# Patient Record
Sex: MALE | Race: WHITE | Employment: UNEMPLOYED | URBAN - METROPOLITAN AREA
[De-identification: names, ages, dates, MRNs, and addresses within clinical notes are randomized per-mention and may not be internally consistent; named-entity substitution may affect disease eponyms.]

---

## 2022-05-13 ENCOUNTER — HOSPITAL ENCOUNTER (EMERGENCY)
Age: 42
Discharge: ANOTHER HOSPITAL | End: 2022-05-13
Attending: EMERGENCY MEDICINE
Payer: MEDICAID

## 2022-05-13 ENCOUNTER — HOSPITAL ENCOUNTER (INPATIENT)
Age: 42
LOS: 6 days | Discharge: HOME | DRG: 751 | End: 2022-05-19
Attending: PSYCHIATRY & NEUROLOGY | Admitting: PSYCHIATRY & NEUROLOGY
Payer: MEDICAID

## 2022-05-13 VITALS
BODY MASS INDEX: 32 KG/M2 | OXYGEN SATURATION: 97 % | WEIGHT: 216.05 LBS | DIASTOLIC BLOOD PRESSURE: 74 MMHG | TEMPERATURE: 97.4 F | HEIGHT: 69 IN | HEART RATE: 80 BPM | RESPIRATION RATE: 18 BRPM | SYSTOLIC BLOOD PRESSURE: 136 MMHG

## 2022-05-13 DIAGNOSIS — R45.851 SUICIDAL IDEATION: Primary | ICD-10-CM

## 2022-05-13 DIAGNOSIS — S30.861A TICK BITE OF ABDOMEN, INITIAL ENCOUNTER: ICD-10-CM
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
## 2022-05-13 LAB
ALBUMIN SERPL-MCNC: 3.4 G/DL (ref 3.5–5)
ALBUMIN/GLOB SERPL: 0.9 {RATIO} (ref 1.1–2.2)
ALP SERPL-CCNC: 109 U/L (ref 45–117)
ALT SERPL-CCNC: 22 U/L (ref 12–78)
AMPHET UR QL SCN: NEGATIVE
ANION GAP SERPL CALC-SCNC: 6 MMOL/L (ref 5–15)
APAP SERPL-MCNC: <2 UG/ML (ref 10–30)
APPEARANCE UR: CLEAR
AST SERPL-CCNC: 9 U/L (ref 15–37)
ATRIAL RATE: 90 BPM
BACTERIA URNS QL MICRO: NEGATIVE /HPF
BARBITURATES UR QL SCN: NEGATIVE
BASOPHILS # BLD: 0.1 K/UL (ref 0–0.1)
BASOPHILS NFR BLD: 1 % (ref 0–1)
BENZODIAZ UR QL: NEGATIVE
BILIRUB SERPL-MCNC: 0.4 MG/DL (ref 0.2–1)
BILIRUB UR QL: NEGATIVE
BUN SERPL-MCNC: 7 MG/DL (ref 6–20)
BUN/CREAT SERPL: 7 (ref 12–20)
CALCIUM SERPL-MCNC: 8.2 MG/DL (ref 8.5–10.1)
CALCULATED P AXIS, ECG09: 61 DEGREES
CALCULATED R AXIS, ECG10: 32 DEGREES
CALCULATED T AXIS, ECG11: 28 DEGREES
CANNABINOIDS UR QL SCN: NEGATIVE
CHLORIDE SERPL-SCNC: 105 MMOL/L (ref 97–108)
CO2 SERPL-SCNC: 26 MMOL/L (ref 21–32)
COCAINE UR QL SCN: NEGATIVE
COLOR UR: NORMAL
COVID-19 RAPID TEST, COVR: NOT DETECTED
CREAT SERPL-MCNC: 0.98 MG/DL (ref 0.7–1.3)
DIAGNOSIS, 93000: NORMAL
DIFFERENTIAL METHOD BLD: ABNORMAL
DRUG SCRN COMMENT,DRGCM: NORMAL
EOSINOPHIL # BLD: 0.1 K/UL (ref 0–0.4)
EOSINOPHIL NFR BLD: 1 % (ref 0–7)
EPITH CASTS URNS QL MICRO: NORMAL /LPF
ERYTHROCYTE [DISTWIDTH] IN BLOOD BY AUTOMATED COUNT: 13.4 % (ref 11.5–14.5)
ETHANOL SERPL-MCNC: <10 MG/DL
FLUAV RNA SPEC QL NAA+PROBE: NOT DETECTED
FLUBV RNA SPEC QL NAA+PROBE: NOT DETECTED
GLOBULIN SER CALC-MCNC: 3.8 G/DL (ref 2–4)
GLUCOSE SERPL-MCNC: 111 MG/DL (ref 65–100)
GLUCOSE UR STRIP.AUTO-MCNC: NEGATIVE MG/DL
HCT VFR BLD AUTO: 43.1 % (ref 36.6–50.3)
HGB BLD-MCNC: 14.2 G/DL (ref 12.1–17)
HGB UR QL STRIP: NEGATIVE
HYALINE CASTS URNS QL MICRO: NORMAL /LPF (ref 0–2)
IMM GRANULOCYTES # BLD AUTO: 0.1 K/UL (ref 0–0.04)
IMM GRANULOCYTES NFR BLD AUTO: 0 % (ref 0–0.5)
KETONES UR QL STRIP.AUTO: NEGATIVE MG/DL
LEUKOCYTE ESTERASE UR QL STRIP.AUTO: NEGATIVE
LYMPHOCYTES # BLD: 2.6 K/UL (ref 0.8–3.5)
LYMPHOCYTES NFR BLD: 23 % (ref 12–49)
MCH RBC QN AUTO: 28.5 PG (ref 26–34)
MCHC RBC AUTO-ENTMCNC: 32.9 G/DL (ref 30–36.5)
MCV RBC AUTO: 86.5 FL (ref 80–99)
METHADONE UR QL: NEGATIVE
MONOCYTES # BLD: 0.9 K/UL (ref 0–1)
MONOCYTES NFR BLD: 8 % (ref 5–13)
NEUTS SEG # BLD: 7.4 K/UL (ref 1.8–8)
NEUTS SEG NFR BLD: 67 % (ref 32–75)
NITRITE UR QL STRIP.AUTO: NEGATIVE
NRBC # BLD: 0 K/UL (ref 0–0.01)
NRBC BLD-RTO: 0 PER 100 WBC
OPIATES UR QL: NEGATIVE
P-R INTERVAL, ECG05: 152 MS
PCP UR QL: NEGATIVE
PH UR STRIP: 5 [PH] (ref 5–8)
PLATELET # BLD AUTO: 380 K/UL (ref 150–400)
PMV BLD AUTO: 8.8 FL (ref 8.9–12.9)
POTASSIUM SERPL-SCNC: 3.9 MMOL/L (ref 3.5–5.1)
PROT SERPL-MCNC: 7.2 G/DL (ref 6.4–8.2)
PROT UR STRIP-MCNC: NEGATIVE MG/DL
Q-T INTERVAL, ECG07: 340 MS
QRS DURATION, ECG06: 78 MS
QTC CALCULATION (BEZET), ECG08: 415 MS
RBC # BLD AUTO: 4.98 M/UL (ref 4.1–5.7)
RBC #/AREA URNS HPF: NORMAL /HPF (ref 0–5)
SALICYLATES SERPL-MCNC: <1.7 MG/DL (ref 2.8–20)
SARS-COV-2, COV2: NORMAL
SARS-COV-2, COV2: NOT DETECTED
SODIUM SERPL-SCNC: 137 MMOL/L (ref 136–145)
SOURCE, COVRS: NORMAL
SP GR UR REFRACTOMETRY: 1.01
UA: UC IF INDICATED,UAUC: NORMAL
UROBILINOGEN UR QL STRIP.AUTO: 0.2 EU/DL (ref 0.2–1)
VENTRICULAR RATE, ECG03: 90 BPM
WBC # BLD AUTO: 11.2 K/UL (ref 4.1–11.1)
WBC URNS QL MICRO: NORMAL /HPF (ref 0–4)

## 2022-05-13 PROCEDURE — 87635 SARS-COV-2 COVID-19 AMP PRB: CPT

## 2022-05-13 PROCEDURE — 90791 PSYCH DIAGNOSTIC EVALUATION: CPT

## 2022-05-13 PROCEDURE — 74011250637 HC RX REV CODE- 250/637: Performed by: EMERGENCY MEDICINE

## 2022-05-13 PROCEDURE — 81001 URINALYSIS AUTO W/SCOPE: CPT

## 2022-05-13 PROCEDURE — 99285 EMERGENCY DEPT VISIT HI MDM: CPT

## 2022-05-13 PROCEDURE — 74011000250 HC RX REV CODE- 250: Performed by: PSYCHIATRY & NEUROLOGY

## 2022-05-13 PROCEDURE — 80307 DRUG TEST PRSMV CHEM ANLYZR: CPT

## 2022-05-13 PROCEDURE — 65220000003 HC RM SEMIPRIVATE PSYCH

## 2022-05-13 PROCEDURE — 93005 ELECTROCARDIOGRAM TRACING: CPT

## 2022-05-13 PROCEDURE — 74011250637 HC RX REV CODE- 250/637: Performed by: PSYCHIATRY & NEUROLOGY

## 2022-05-13 PROCEDURE — 85025 COMPLETE CBC W/AUTO DIFF WBC: CPT

## 2022-05-13 PROCEDURE — 36415 COLL VENOUS BLD VENIPUNCTURE: CPT

## 2022-05-13 PROCEDURE — 80053 COMPREHEN METABOLIC PANEL: CPT

## 2022-05-13 PROCEDURE — 80179 DRUG ASSAY SALICYLATE: CPT

## 2022-05-13 PROCEDURE — 87636 SARSCOV2 & INF A&B AMP PRB: CPT

## 2022-05-13 PROCEDURE — 80143 DRUG ASSAY ACETAMINOPHEN: CPT

## 2022-05-13 PROCEDURE — 82077 ASSAY SPEC XCP UR&BREATH IA: CPT

## 2022-05-13 RX ORDER — DOXYCYCLINE HYCLATE 100 MG
100 TABLET ORAL 2 TIMES DAILY
Qty: 14 TABLET | Refills: 0 | Status: SHIPPED | OUTPATIENT
Start: 2022-05-13 | End: 2022-05-19

## 2022-05-13 RX ORDER — LORAZEPAM 2 MG/ML
1 INJECTION INTRAMUSCULAR
Status: DISCONTINUED | OUTPATIENT
Start: 2022-05-13 | End: 2022-05-19 | Stop reason: HOSPADM

## 2022-05-13 RX ORDER — TRAZODONE HYDROCHLORIDE 150 MG/1
150 TABLET ORAL
COMMUNITY
End: 2022-05-19

## 2022-05-13 RX ORDER — BENZTROPINE MESYLATE 1 MG/1
1 TABLET ORAL
Status: DISCONTINUED | OUTPATIENT
Start: 2022-05-13 | End: 2022-05-19 | Stop reason: HOSPADM

## 2022-05-13 RX ORDER — ADHESIVE BANDAGE
30 BANDAGE TOPICAL DAILY PRN
Status: DISCONTINUED | OUTPATIENT
Start: 2022-05-13 | End: 2022-05-19 | Stop reason: HOSPADM

## 2022-05-13 RX ORDER — OLANZAPINE 5 MG/1
5 TABLET ORAL
Status: DISCONTINUED | OUTPATIENT
Start: 2022-05-13 | End: 2022-05-19 | Stop reason: HOSPADM

## 2022-05-13 RX ORDER — DOXYCYCLINE HYCLATE 100 MG
100 TABLET ORAL EVERY 12 HOURS
Status: DISCONTINUED | OUTPATIENT
Start: 2022-05-13 | End: 2022-05-19 | Stop reason: HOSPADM

## 2022-05-13 RX ORDER — HYDROXYZINE 25 MG/1
50 TABLET, FILM COATED ORAL
Status: DISCONTINUED | OUTPATIENT
Start: 2022-05-13 | End: 2022-05-19 | Stop reason: HOSPADM

## 2022-05-13 RX ORDER — DIPHENHYDRAMINE HYDROCHLORIDE 50 MG/ML
50 INJECTION, SOLUTION INTRAMUSCULAR; INTRAVENOUS
Status: DISCONTINUED | OUTPATIENT
Start: 2022-05-13 | End: 2022-05-19 | Stop reason: HOSPADM

## 2022-05-13 RX ORDER — DOXYCYCLINE HYCLATE 100 MG
100 TABLET ORAL
Status: COMPLETED | OUTPATIENT
Start: 2022-05-13 | End: 2022-05-13

## 2022-05-13 RX ORDER — TRAZODONE HYDROCHLORIDE 50 MG/1
50 TABLET ORAL
Status: DISCONTINUED | OUTPATIENT
Start: 2022-05-13 | End: 2022-05-13

## 2022-05-13 RX ORDER — ACETAMINOPHEN 325 MG/1
650 TABLET ORAL
Status: DISCONTINUED | OUTPATIENT
Start: 2022-05-13 | End: 2022-05-19 | Stop reason: HOSPADM

## 2022-05-13 RX ORDER — DULOXETIN HYDROCHLORIDE 60 MG/1
60 CAPSULE, DELAYED RELEASE ORAL 2 TIMES DAILY
Status: DISCONTINUED | OUTPATIENT
Start: 2022-05-13 | End: 2022-05-19 | Stop reason: HOSPADM

## 2022-05-13 RX ORDER — DULOXETIN HYDROCHLORIDE 60 MG/1
60 CAPSULE, DELAYED RELEASE ORAL 2 TIMES DAILY
COMMUNITY
End: 2022-05-19

## 2022-05-13 RX ORDER — BUPROPION HYDROCHLORIDE 100 MG/1
100 TABLET ORAL 2 TIMES DAILY
COMMUNITY
End: 2022-05-19

## 2022-05-13 RX ORDER — HALOPERIDOL 5 MG/ML
5 INJECTION INTRAMUSCULAR
Status: DISCONTINUED | OUTPATIENT
Start: 2022-05-13 | End: 2022-05-19 | Stop reason: HOSPADM

## 2022-05-13 RX ADMIN — DOXYCYCLINE HYCLATE 100 MG: 100 TABLET, COATED ORAL at 21:23

## 2022-05-13 RX ADMIN — DOXYCYCLINE HYCLATE 100 MG: 100 TABLET, COATED ORAL at 06:21

## 2022-05-13 RX ADMIN — HYDROXYZINE HYDROCHLORIDE 50 MG: 25 TABLET, FILM COATED ORAL at 21:23

## 2022-05-13 RX ADMIN — DOXYCYCLINE HYCLATE 100 MG: 100 TABLET, COATED ORAL at 12:07

## 2022-05-13 RX ADMIN — DULOXETINE HYDROCHLORIDE 60 MG: 60 CAPSULE, DELAYED RELEASE ORAL at 17:33

## 2022-05-13 RX ADMIN — TRAZODONE HYDROCHLORIDE 150 MG: 100 TABLET ORAL at 21:23

## 2022-05-13 RX ADMIN — BACITRACIN ZINC NEOMYCIN SULFATE POLYMYXIN B SULFATE: 400; 3.5; 5 OINTMENT TOPICAL at 18:51
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
## 2022-05-13 NOTE — ED NOTES
Bedside shift change report given to Zo (oncoming nurse) by Carola/Cassy (offgoing nurse). Report included the following information SBAR, Kardex and ED Summary.
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
## 2022-05-13 NOTE — GROUP NOTE
EVONNE  GROUP DOCUMENTATION INDIVIDUAL                                                                          Group Therapy Note    Date: 5/13/2022    Group Start Time: 1400  Group End Time: 1500  Group Topic: Recreational/Music Therapy    [facility] 3 ACUTE BEHAV [facility]    [place], [address] [facility] GROUP DOCUMENTATION GROUP    Group Therapy Note    Attendees: 4         Attendance: Attended    Patient's Goal:  To concentrate on selected task    Interventions/techniques: Supported-crafts,games,music    Follows Directions:  Followed directions    Interactions: Interacted appropriately    Mental Status: Calm    Behavior/appearance: Attentive, Cooperative and Needed prompting    Goals Achieved: Able to engage in interactions and Able to listen to others-active participant in game    Anson Backbone

## 2022-05-13 NOTE — BH NOTES
Precious Álvarez RN (wound care) to asses patient. Recommended neosporin BID for sores on legs and abdomen and po benadryl for itching. RN updated MD.     Uneventful shift. Hourly rounding completed by RN. NAD. Bedside shift change report given to Kelly Solano RN (oncoming nurse) by Sinan Foreman RN (offgoing nurse). Report included the following information SBAR, Kardex, MAR, Accordion and Recent Results.

## 2022-05-13 NOTE — ED NOTES
TRANSFER - OUT REPORT:    Verbal report given to Angel Sousa RN on Yousuf Calderón  being transferred to [place] [address] Dept for routine progression of care       Report consisted of patients Situation, Background, Assessment and   Recommendations(SBAR). Information from the following report(s) SBAR, Kardex, ED Summary, Intake/Output, MAR, Recent Results, Med Rec Status and Cardiac Rhythm and vital signs was reviewed with the receiving nurse. Lines:       Opportunity for questions and clarification was provided.       Patient transported with:   [facility]

## 2022-05-13 NOTE — ED NOTES
[facility] leaving ED [facility] to  transport patient to [facility].

## 2022-05-13 NOTE — ED NOTES
Attempted to call Julia at [facility] to advise that patient was given a prescription for an antibiotic. Julia wasn't available, left message with nurse. Nurse read back and confirmed.

## 2022-05-13 NOTE — BSMART NOTE
BSMART Note    Patient has been accepted to [facility] room 327 by Dr. Dorothea Shone. The number for nursing report is [phone]. He cannot be transferred until his PCR COVID test results.     Luly Sarabia MA

## 2022-05-13 NOTE — ED TRIAGE NOTES
Patient brought in via police custody with thoughts of SI. Stated that he has a plan to \"jump in front of a train\". Past history SI in 2017 where he had \"plans to shoot himself in the head but then stopped before actually doing it\". Patient is cooperative at this time.

## 2022-05-13 NOTE — GROUP NOTE
EVONNE  GROUP DOCUMENTATION INDIVIDUAL                                                                          Group Therapy Note    Date: 5/13/2022    Group Start Time: 1000  Group End Time: 1100  Group Topic: Topic Group    [facility] 3 ACUTE BEHAV TH    [place] [facility] GROUP    Group Therapy Note    Attendees: 7         Attendance: Did not attend    Patient's Goal:      Interventions/techniques  Valeria Chester

## 2022-05-13 NOTE — PROGRESS NOTES
Wound Care Consult 5/13/22    Patient has multiple bug bites to BLE and abdomen. Patient has a reddened area on LLQ from reported tick bite. No areas with exudate. Some areas are crusting, other areas are open due to scratching. Spoke with Ernestine Davila RN    Keep skin clean, dry and apply neosporin to open areas twice daily. Apply moisturizing cream to dry feet and heels. Patient has thick, brittle nails and calloused heels and some calluses on base of toes. No other skin issues. No follow up needed.      Maureen Chacon RN   Wound Care

## 2022-05-13 NOTE — PROGRESS NOTES
Behavioral Services  Medicare Certification Upon Admission    I certify that this patient's inpatient psychiatric hospital admission is medically necessary for:    [x] (1) Treatment which could reasonably be expected to improve this patient's condition,       [x] (2) Or for diagnostic study;     AND     [x](2) The inpatient psychiatric services are provided while the individual is under the care of a physician and are included in the individualized plan of care.     Estimated length of stay/service 5-7 days    Plan for post-hospital care home    Electronically signed by Holden Fagan MD on 5/13/2022 at 11:44 AM

## 2022-05-13 NOTE — PROGRESS NOTES
Amy Gupta arrived on the unit at 1100 on a voluntary basis accompanied by security. Procedure was explained and consent obtained for skin search. Search performed by Socorro Lanes, RN and Layne Al RN. Diffuse insect bites noted on entirety of body, some open but no exudate noted. Per ED report, tick bite noted to left lower abdoomen, prophylactic abx initiated on admission and pt educatied re: s/s of worsening condition. No contrband found on pt or in pt effects. He was calm and cooperative throughout. Amy Gupta presented to the ED c/o increasing SI with a plan to jump in front of a train. He states that being homeless and pending legal charges filed by his parents are main triggers. He states that he is trying to get legal charges reversed and that this is causing him increased stress. He reports compliance with his medications, but feels they may need to be adjusted. He does not feel he can maintain his safety on an outpatient basis. On admission, Amy Gupta is A&O x4, disheveled and clothing is malodorous d/t current living situation. His UDS and BAL were both negative and he denies all substance use. He endorses passive SI without plan or intent while inpatient and states that he can alert staff if these feelings become overwhelming. He denies HI/AVH. Appropriate range of affect with depressed mood. He was oriented to the unit and provided an admission packet and confidentiality code. Seth Moon He will be maintained on Q15min checks for safety.

## 2022-05-13 NOTE — PROGRESS NOTES
[facility] Medication Reconciliation     Information obtained from: Patient, [address],   RxQuery data available1: No    Comments/recommendations:  Patient is a fair historian, able to name all of his medications and the associated strengths besides bupropion. [address] [facility] to verify the medications and strengths (filled in [facility] in [place])  Of note, there was an Abilify 10mg prescription at the pharmacy that was on hold and never filled  Doxycyline 100mg BID x 7 days was a prescription from [facility] ED (where he was transferred from) - never filled this outpatient    Medication changes (since last review): Added  N/a  Removed  N/a  Adjusted  N/a    The [place] Prescription Monitoring Program () was accessed to determine fill history of any controlled medications. N/A   1RxQuery pharmacy benefit data reflects medications filled and processed through the patient's insurance, however                this data does NOT capture whether the medication was picked up or is currently being taken by the patient. Patient allergies: Allergies as of 05/13/2022    (No Known Allergies)         Prior to Admission Medications   Prescriptions Last Dose Informant Patient Reported? Taking? DULoxetine (CYMBALTA) 60 mg capsule 5/12/2022 at Unknown time  Yes Yes   Sig: Take 60 mg by mouth two (2) times a day. buPROPion (WELLBUTRIN) 100 mg tablet 5/12/2022 at Unknown time  Yes Yes   Sig: Take 100 mg by mouth two (2) times a day. doxycycline (VIBRA-TABS) 100 mg tablet 5/13/2022 at Unknown time  No Yes   Sig: Take 1 Tablet by mouth two (2) times a day for 7 days. traZODone (DESYREL) 150 mg tablet 5/12/2022 at Unknown time  Yes Yes   Sig: Take 150 mg by mouth nightly.       Facility-Administered Medications: None          Thank you,  REED Kaur

## 2022-05-13 NOTE — BSMART NOTE
Access is aware of patient. This writer is awaiting PCR COVID test for patient to be presented for admission.

## 2022-05-14 PROBLEM — F43.21 ADJUSTMENT DISORDER WITH DEPRESSED MOOD: Status: ACTIVE | Noted: 2022-05-14

## 2022-05-14 PROCEDURE — 74011250637 HC RX REV CODE- 250/637

## 2022-05-14 PROCEDURE — 74011250637 HC RX REV CODE- 250/637: Performed by: PSYCHIATRY & NEUROLOGY

## 2022-05-14 PROCEDURE — 65220000003 HC RM SEMIPRIVATE PSYCH

## 2022-05-14 RX ORDER — BUPROPION HYDROCHLORIDE 150 MG/1
300 TABLET ORAL
Status: DISCONTINUED | OUTPATIENT
Start: 2022-05-15 | End: 2022-05-19 | Stop reason: HOSPADM

## 2022-05-14 RX ORDER — DULOXETIN HYDROCHLORIDE 60 MG/1
60 CAPSULE, DELAYED RELEASE ORAL 2 TIMES DAILY
Status: DISCONTINUED | OUTPATIENT
Start: 2022-05-14 | End: 2022-05-15 | Stop reason: SDUPTHER

## 2022-05-14 RX ADMIN — DOXYCYCLINE HYCLATE 100 MG: 100 TABLET, COATED ORAL at 21:30

## 2022-05-14 RX ADMIN — HYDROXYZINE HYDROCHLORIDE 50 MG: 25 TABLET, FILM COATED ORAL at 08:08

## 2022-05-14 RX ADMIN — DULOXETINE HYDROCHLORIDE 60 MG: 60 CAPSULE, DELAYED RELEASE ORAL at 21:30

## 2022-05-14 RX ADMIN — DULOXETINE HYDROCHLORIDE 60 MG: 60 CAPSULE, DELAYED RELEASE ORAL at 08:07

## 2022-05-14 RX ADMIN — BACITRACIN ZINC NEOMYCIN SULFATE POLYMYXIN B SULFATE: 400; 3.5; 5 OINTMENT TOPICAL at 17:29

## 2022-05-14 RX ADMIN — TRAZODONE HYDROCHLORIDE 150 MG: 100 TABLET ORAL at 21:29

## 2022-05-14 RX ADMIN — BACITRACIN ZINC NEOMYCIN SULFATE POLYMYXIN B SULFATE: 400; 3.5; 5 OINTMENT TOPICAL at 08:10

## 2022-05-14 RX ADMIN — DULOXETINE HYDROCHLORIDE 60 MG: 60 CAPSULE, DELAYED RELEASE ORAL at 17:28

## 2022-05-14 RX ADMIN — DOXYCYCLINE HYCLATE 100 MG: 100 TABLET, COATED ORAL at 08:06

## 2022-05-14 NOTE — PROGRESS NOTES
Problem: Suicide  Goal: *STG: Remains safe in hospital  Outcome: Progressing Towards Goal  Goal: *STG: Seeks staff when feelings of self harm or harm towards others arise  Outcome: Progressing Towards Goal  Goal: *STG: Attends activities and groups  Outcome: Progressing Towards Goal  Goal: *STG/LTG: Complies with medication therapy  Outcome: Progressing Towards Goal  Goal: *STG/LTG: No longer expresses self destructive or suicidal thoughts  Outcome: Progressing Towards Goal    [id number]- Report given by Angus Lund RN and I assume care of the patient. Patient ate in the day room this evening and compliant with medications. Patient talked with peers this evening. 0600- Patient slept 7 hrs.

## 2022-05-14 NOTE — PROGRESS NOTES
Bedside shift change report given to [id number] [facility] and Ernestine Davila RN (oncoming nurse) by Kareem Nur (offgoing nurse). Report included the following information SBAR, Kardex, MAR, and Accordion. Assumed care of patient sitting in dayroom. AO x4. Rated depression and anxiety 4/10. Did not endorse pain, SI, HI, AVH. Patient is pleasant and cooperative, med and meal compliant. Inspected patient's abdomen and there were bug bites throughout skin. Patient reported no irritation. Patient present on the unit. Med and meal compliant. Uneventful shift. Patient currently in bed resting. Hourly rounding completed. Bedside shift change report given to Kareem Nur (oncoming nurse) by Ashley Bethea Student Nurse and Ernestine Davila RN (offgoing nurse). Report included the following information SBAR, Kardex, MAR and Accordion.      Problem: Suicide  Goal: *STG: Remains safe in hospital  Outcome: Progressing Towards Goal  Goal: *STG/LTG: Complies with medication therapy  Outcome: Progressing Towards Goal  Goal: *STG/LTG: No longer expresses self destructive or suicidal thoughts  Outcome: Progressing Towards Goal
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
## 2022-05-15 PROCEDURE — 74011250637 HC RX REV CODE- 250/637: Performed by: PSYCHIATRY & NEUROLOGY

## 2022-05-15 PROCEDURE — 74011250637 HC RX REV CODE- 250/637

## 2022-05-15 PROCEDURE — 65220000003 HC RM SEMIPRIVATE PSYCH

## 2022-05-15 RX ADMIN — DULOXETINE HYDROCHLORIDE 60 MG: 60 CAPSULE, DELAYED RELEASE ORAL at 17:18

## 2022-05-15 RX ADMIN — DULOXETINE HYDROCHLORIDE 60 MG: 60 CAPSULE, DELAYED RELEASE ORAL at 08:03

## 2022-05-15 RX ADMIN — BUPROPION HYDROCHLORIDE 300 MG: 150 TABLET, EXTENDED RELEASE ORAL at 08:03

## 2022-05-15 RX ADMIN — TRAZODONE HYDROCHLORIDE 150 MG: 100 TABLET ORAL at 21:14

## 2022-05-15 RX ADMIN — BACITRACIN ZINC NEOMYCIN SULFATE POLYMYXIN B SULFATE: 400; 3.5; 5 OINTMENT TOPICAL at 17:19

## 2022-05-15 RX ADMIN — DOXYCYCLINE HYCLATE 100 MG: 100 TABLET, COATED ORAL at 08:05

## 2022-05-15 RX ADMIN — DOXYCYCLINE HYCLATE 100 MG: 100 TABLET, COATED ORAL at 21:14

## 2022-05-15 RX ADMIN — BACITRACIN ZINC NEOMYCIN SULFATE POLYMYXIN B SULFATE: 400; 3.5; 5 OINTMENT TOPICAL at 08:05

## 2022-05-15 NOTE — PROGRESS NOTES
Bedside shift change report given to DOROTHY Fourneir (oncoming nurse) by Kelly Gonzalez RN (offgoing nurse). Report included the following information SBAR, Kardex, MAR, and Accordion. Assumed care of patient sitting quietly in day room. AO x 4. Rated depression 1/10. Did not endorse pain, anxiety, SI, HI, AVH. Patient states he is \"getting better every day. \" His goal is to file for disability. Pleasant and cooperative, med/meal compliant. Unremarkable shift. Present on unit. Appropriately interacting with peers and staff. NAD. Bedside shift change report given to DOROTHY Seo (oncoming nurse) by DOROTHY Fournier (offgoing nurse). Report included the following information SBAR, Kardex, MAR, Accordion and Recent Results. Problem: Suicide  Goal: *STG: Remains safe in hospital  Outcome: Progressing Towards Goal  Goal: *STG/LTG: Complies with medication therapy  Outcome: Progressing Towards Goal     Problem: Falls - Risk of  Goal: *Absence of Falls  Description: Document Madi Fall Risk and appropriate interventions in the flowsheet.   Outcome: Progressing Towards Goal  Note: Fall Risk Interventions:            Medication Interventions: Teach patient to arise slowly

## 2022-05-15 NOTE — PROGRESS NOTES
Problem: Suicide  Goal: *STG: Remains safe in hospital  Outcome: Progressing Towards Goal  Goal: *STG: Seeks staff when feelings of self harm or harm towards others arise  Outcome: Progressing Towards Goal  Goal: *STG: Attends activities and groups  Outcome: Progressing Towards Goal  Goal: *STG:  Verbalizes alternative ways of dealing with maladaptive feelings/behaviors  Outcome: Progressing Towards Goal  Goal: *STG/LTG: Complies with medication therapy  Outcome: Progressing Towards Goal  Goal: *STG/LTG: No longer expresses self destructive or suicidal thoughts  Outcome: Progressing Towards Goal    [id number]- Report given by Alisia Castro RN, and I assume care of the patient. Patient is alert and oriented x 4, smiles, calm and cooperative. He is pacing the hallway and talking to peers. He said that he feels better today and the medication is kicking in working. He said that his plan is to get his self together and get on disability. Patient denies SI/HI/AVH. Patient is compliant with medications and slept most of the night. Hourly rounds done throughout the night. 0600- Patient slept 8.5 hrs.

## 2022-05-15 NOTE — PROGRESS NOTES
Chief Complaint:  \"I am good. \"    Length of Stay: 2 Days    Interval History:  Pt states he is doing better today. He states he slept well. He is eating well. He states depression levels have decreased and anxiety is almost gone today. He is medication compliant and has no SE's to report. He is discharge focused. Past Medical History:  No past medical history on file. Labs:  Lab Results   Component Value Date/Time    WBC 11.2 (H) 05/13/2022 04:24 AM    HGB 14.2 05/13/2022 04:24 AM    HCT 43.1 05/13/2022 04:24 AM    PLATELET 647 71/09/4450 04:24 AM    MCV 86.5 05/13/2022 04:24 AM      Lab Results   Component Value Date/Time    Sodium 137 05/13/2022 04:24 AM    Potassium 3.9 05/13/2022 04:24 AM    Chloride 105 05/13/2022 04:24 AM    CO2 26 05/13/2022 04:24 AM    Anion gap 6 05/13/2022 04:24 AM    Glucose 111 (H) 05/13/2022 04:24 AM    BUN 7 05/13/2022 04:24 AM    Creatinine 0.98 05/13/2022 04:24 AM    BUN/Creatinine ratio 7 (L) 05/13/2022 04:24 AM    GFR est AA >60 05/13/2022 04:24 AM    GFR est non-AA >60 05/13/2022 04:24 AM    Calcium 8.2 (L) 05/13/2022 04:24 AM    Bilirubin, total 0.4 05/13/2022 04:24 AM    Alk.  phosphatase 109 05/13/2022 04:24 AM    Protein, total 7.2 05/13/2022 04:24 AM    Albumin 3.4 (L) 05/13/2022 04:24 AM    Globulin 3.8 05/13/2022 04:24 AM    A-G Ratio 0.9 (L) 05/13/2022 04:24 AM    ALT (SGPT) 22 05/13/2022 04:24 AM      Vitals:    05/13/22 2142 05/14/22 0823 05/14/22 2013 05/15/22 0802   BP: (!) 142/74 137/70 129/69 125/73   Pulse: 84 70 82 77   Resp: 18 18 18 16   Temp: 98.2 °F (36.8 °C) 97.2 °F (36.2 °C) 97.9 °F (36.6 °C) 97.3 °F (36.3 °C)   SpO2: 99% 99% 100% 97%   Weight:       Height:             Current Facility-Administered Medications   Medication Dose Route Frequency Provider Last Rate Last Admin    buPROPion XL (WELLBUTRIN XL) tablet 300 mg  300 mg Oral 7am Ethhomero Fee A, NP   300 mg at 05/15/22 0803    traZODone (DESYREL) tablet 150 mg  150 mg Oral QHS Angelita Levin, NP   150 mg at 05/14/22 2129    OLANZapine (ZyPREXA) tablet 5 mg  5 mg Oral Q6H PRN Santhosh Sifuentes MD        haloperidol lactate (HALDOL) injection 5 mg  5 mg IntraMUSCular Q6H PRN Santhosh Sifuentes MD        benztropine (COGENTIN) tablet 1 mg  1 mg Oral BID PRN Santhosh Sifuentes MD        diphenhydrAMINE (BENADRYL) injection 50 mg  50 mg IntraMUSCular BID PRN Santhosh Sifuentes MD        hydrOXYzine HCL (ATARAX) tablet 50 mg  50 mg Oral TID PRN Santhosh Sifuentes MD   50 mg at 05/14/22 0808    LORazepam (ATIVAN) injection 1 mg  1 mg IntraMUSCular Q4H PRN Santhosh Sifuentes MD        acetaminophen (TYLENOL) tablet 650 mg  650 mg Oral Q4H PRN Santhosh Sifuentes MD        magnesium hydroxide (MILK OF MAGNESIA) 400 mg/5 mL oral suspension 30 mL  30 mL Oral DAILY PRN Santhosh Sifuentes MD        doxycycline (VIBRA-TABS) tablet 100 mg  100 mg Oral Q12H Santhosh Sifuentes MD   100 mg at 05/15/22 0805    DULoxetine (CYMBALTA) capsule 60 mg  60 mg Oral BID Bruce WINCHESTER MD   60 mg at 05/14/22 1728    neomycin-bacitracin-polymyxin (NEOSPORIN) ointment   Topical BID Ariana Calderon MD   Given at 05/15/22 0805         Assessment and Plan:  Day Perez meets criteria for a diagnosis of MDD recurrent with psychotic features, PTSD, JACOBY    Continue the medication regimen as prescribed  Disposition planning to continue. A coordinated, multidisplinary treatment team round was conducted with the patient, nurses, pharmcist,  and writer present. Discussions held with , and/or with family members; Complete current electronic health record for patient was reviewed in full including consultant notes, ancillary staff notes, nurses and tech notes, labs and vitals.   I certify that this patients inpatient psychiatric hospital services furnished since the previous certification were, and continue to be, required for treatment that could reasonably be expected to improve the patient's condition, or for diagnostic study, and that the patient continues to need, on a daily basis, active treatment furnished directly by or requiring the supervision of inpatient psychiatric facility personnel. In addition, the hospital records show that services furnished were intensive treatment services, admission or related services, or equivalent services.

## 2022-05-16 PROCEDURE — 74011250637 HC RX REV CODE- 250/637: Performed by: PSYCHIATRY & NEUROLOGY

## 2022-05-16 PROCEDURE — 65220000003 HC RM SEMIPRIVATE PSYCH

## 2022-05-16 PROCEDURE — 74011250637 HC RX REV CODE- 250/637

## 2022-05-16 RX ADMIN — TRAZODONE HYDROCHLORIDE 150 MG: 100 TABLET ORAL at 21:07

## 2022-05-16 RX ADMIN — DOXYCYCLINE HYCLATE 100 MG: 100 TABLET, COATED ORAL at 09:09

## 2022-05-16 RX ADMIN — BUPROPION HYDROCHLORIDE 300 MG: 150 TABLET, EXTENDED RELEASE ORAL at 09:09

## 2022-05-16 RX ADMIN — BACITRACIN ZINC NEOMYCIN SULFATE POLYMYXIN B SULFATE 1 EACH: 400; 3.5; 5 OINTMENT TOPICAL at 09:20

## 2022-05-16 RX ADMIN — DOXYCYCLINE HYCLATE 100 MG: 100 TABLET, COATED ORAL at 21:07

## 2022-05-16 RX ADMIN — BACITRACIN ZINC NEOMYCIN SULFATE POLYMYXIN B SULFATE 1 EACH: 400; 3.5; 5 OINTMENT TOPICAL at 16:35

## 2022-05-16 RX ADMIN — DULOXETINE HYDROCHLORIDE 60 MG: 60 CAPSULE, DELAYED RELEASE ORAL at 16:31

## 2022-05-16 RX ADMIN — DULOXETINE HYDROCHLORIDE 60 MG: 60 CAPSULE, DELAYED RELEASE ORAL at 09:09
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
## 2022-05-16 NOTE — GROUP NOTE
EVONNE  GROUP DOCUMENTATION INDIVIDUAL                                                                          Group Therapy Note    Date: 5/16/2022    Group Start Time: 1000  Group End Time: 1100  Group Topic: Topic Group    [facility] [place] [facility] ACUTE BEH[facility]    [place], [address] [facility] GROUP DOCUMENTATION GROUP    Group Therapy Note    Attendees: 8         Attendance: Attended    Patient's Goal:  To participate in relaxation activity    Interventions/techniques: Supported-game    Follows Directions:  Followed directions    Interactions: Interacted appropriately    Mental Status: Calm    Behavior/appearance: Attentive, Cooperative and Needed prompting    Goals Achieved: Able to engage in interactions and Able to listen to others-active participant in game    Shelba Orn

## 2022-05-16 NOTE — PROGRESS NOTES
Chief Complaint:  \"I am good. \"    Length of Stay: 3 Days    Interval History:  Pt states he is doing better today. He states he slept well. He is eating well. He states depression levels have decreased and anxiety is almost gone today. He is medication compliant and has no SE's to report. He is discharge focused. 5/16 Sindhu Foley reports feeling great compared to admission and moods are good. Anxiety and depression are nearly non-existent. He had several tic bites from living in the woods. Denies SI/HI/AH/VH. Pleasant with no aggression or violence. Appropriately interactive and aware. Focusing better. Tolerating medications well. Eating and sleeping fairly. Discharge focused. Past Medical History:  No past medical history on file. Labs:  Lab Results   Component Value Date/Time    WBC 11.2 (H) 05/13/2022 04:24 AM    HGB 14.2 05/13/2022 04:24 AM    HCT 43.1 05/13/2022 04:24 AM    PLATELET 961 39/31/9916 04:24 AM    MCV 86.5 05/13/2022 04:24 AM      Lab Results   Component Value Date/Time    Sodium 137 05/13/2022 04:24 AM    Potassium 3.9 05/13/2022 04:24 AM    Chloride 105 05/13/2022 04:24 AM    CO2 26 05/13/2022 04:24 AM    Anion gap 6 05/13/2022 04:24 AM    Glucose 111 (H) 05/13/2022 04:24 AM    BUN 7 05/13/2022 04:24 AM    Creatinine 0.98 05/13/2022 04:24 AM    BUN/Creatinine ratio 7 (L) 05/13/2022 04:24 AM    GFR est AA >60 05/13/2022 04:24 AM    GFR est non-AA >60 05/13/2022 04:24 AM    Calcium 8.2 (L) 05/13/2022 04:24 AM    Bilirubin, total 0.4 05/13/2022 04:24 AM    Alk.  phosphatase 109 05/13/2022 04:24 AM    Protein, total 7.2 05/13/2022 04:24 AM    Albumin 3.4 (L) 05/13/2022 04:24 AM    Globulin 3.8 05/13/2022 04:24 AM    A-G Ratio 0.9 (L) 05/13/2022 04:24 AM    ALT (SGPT) 22 05/13/2022 04:24 AM      Vitals:    05/14/22 2013 05/15/22 0802 05/15/22 2226 05/16/22 0753   BP: 129/69 125/73 128/67 118/66   Pulse: 82 77 82 74   Resp: 18 16 17 17   Temp: 97.9 °F (36.6 °C) 97.3 °F (36.3 °C) 98.6 °F (37 °C) 97.3 °F (36.3 °C)   SpO2: 100% 97% 97% 96%   Weight:       Height:             Current Facility-Administered Medications   Medication Dose Route Frequency Provider Last Rate Last Admin    buPROPion XL (WELLBUTRIN XL) tablet 300 mg  300 mg Oral 7am [place] Coombes A, NP   300 mg at 05/16/22 0909    traZODone (DESYREL) tablet 150 mg  150 mg Oral QHS [place] Coombes A, NP   150 mg at 05/15/22 2114    OLANZapine (ZyPREXA) tablet 5 mg  5 mg Oral Q6H PRN Santhosh Sifuentes MD        haloperidol lactate (HALDOL) injection 5 mg  5 mg IntraMUSCular Q6H PRN Santhosh Sifuentes MD        benztropine (COGENTIN) tablet 1 mg  1 mg Oral BID PRN Santhosh Sifuentes MD        diphenhydrAMINE (BENADRYL) injection 50 mg  50 mg IntraMUSCular BID PRN Santhosh Sifuentes MD        hydrOXYzine HCL (ATARAX) tablet 50 mg  50 mg Oral TID PRN Santhosh Sifuentes MD   50 mg at 05/14/22 0808    LORazepam (ATIVAN) injection 1 mg  1 mg IntraMUSCular Q4H PRN Santhosh Sifuentes MD        acetaminophen (TYLENOL) tablet 650 mg  650 mg Oral Q4H PRN Santhosh Sifuentes MD        magnesium hydroxide (MILK OF MAGNESIA) 400 mg/5 mL oral suspension 30 mL  30 mL Oral DAILY PRN Santhosh Sifuentes MD        doxycycline (VIBRA-TABS) tablet 100 mg  100 mg Oral Q12H Santhosh Sifuentes MD   100 mg at 05/16/22 0509    DULoxetine (CYMBALTA) capsule 60 mg  60 mg Oral BID Bimal WINCHESTER MD   60 mg at 05/16/22 1196    neomycin-bacitracin-polymyxin (NEOSPORIN) ointment   Topical BID Paris Garcia MD   1 Each at 05/16/22 0920         Assessment and Plan:  Kanwal Laurent meets criteria for a diagnosis of MDD recurrent with psychotic features, PTSD, JACOBY    Continue the medication regimen as prescribed  Medication modification as appropriate  Disposition planning to continue.      A coordinated, multidisplinary treatment team round was conducted with the patient, nurses, pharmcist,  and writer present. Discussions held with , and/or with family members; Complete current electronic health record for patient was reviewed in full including consultant notes, ancillary staff notes, nurses and tech notes, labs and vitals. I certify that this patients inpatient psychiatric hospital services furnished since the previous certification were, and continue to be, required for treatment that could reasonably be expected to improve the patient's condition, or for diagnostic study, and that the patient continues to need, on a daily basis, active treatment furnished directly by or requiring the supervision of inpatient psychiatric facility personnel. In addition, the hospital records show that services furnished were intensive treatment services, admission or related services, or equivalent services.

## 2022-05-16 NOTE — GROUP NOTE
EVONNE  GROUP DOCUMENTATION INDIVIDUAL                                                                          Group Therapy Note    Date: 5/16/2022    Group Start Time: 1400  Group End Time: 1500  Group Topic: Recreational/Music Therapy    [facility] [place] [address] AC[place]AV [facility]    [place], [address] [facility] GROUP DOCUMENTATION GROUP    Group Therapy Note    Attendees: 9         Attendance: Attended    Patient's Goal:  To concentrate on selected task    Interventions/techniques: Supported-crafts,games,music    Follows Directions:  Followed directions    Interactions: Interacted appropriately    Mental Status: Calm    Behavior/appearance: Attentive, Cooperative and Needed prompting    Goals Achieved: Able to engage in interactions and Able to listen to others-active participant in game      Megan Hakeem

## 2022-05-16 NOTE — PROGRESS NOTES
Problem: Discharge Planning  Goal: *Knowledge of medication management  Outcome: Progressing Towards Goal  Note: Patient verbalizes understanding of medication regimen. Patient is taking medications as prescribed. Problem: Discharge Planning  Goal: *Discharge to safe environment  Outcome: Not Progressing Towards Goal  Note: Patient is homeless. Patient does not identify an alternative discharge option.

## 2022-05-16 NOTE — PROGRESS NOTES
[id number]- Hourly rounds done. 0109-Hourly rounds done. 0215-Hourly rounds done. 0315- Hourly rounds done. 6171- Hourly rounds done.

## 2022-05-16 NOTE — PROGRESS NOTES
Bedside and Verbal shift change report given to Brandie Andrade RN (oncoming nurse) by DOROTHY Amanda(offgoing nurse). Report included the following information SBAR, Kardex, MAR, Accordion, and Recent Results. Assumed the care of the patient, he denies SI, HI, anxiety and depression. He has a left below the elbow amputation. He stated his last BM was 5/15/2022.  0800- no distress noted   1000- no distress noted   1200- no distress noted   1400- no distress noted  1600- no distress noted participated in activities today  1800- no distress noted   Problem: Suicide  Goal: *STG: Remains safe in hospital  Outcome: Progressing Towards Goal  Goal: *STG: Attends activities and groups  Outcome: Progressing Towards Goal     Problem: Falls - Risk of  Goal: *Absence of Falls  Description: Document Madi Fall Risk and appropriate interventions in the flowsheet.   Outcome: Progressing Towards Goal  Note: Fall Risk Interventions:     Medication Interventions: Teach patient to arise slowly    Problem: Patient Education: Go to Patient Education Activity  Goal: Patient/Family Education  Outcome: Progressing Towards Goal     Problem: Discharge Planning  Goal: *Knowledge of medication management  Outcome: Progressing Towards Goal

## 2022-05-16 NOTE — PROGRESS NOTES
Patient observed sitting in dayroom during transition of care. Patient alert, calm and cooperative. Patient reported no immediate concerns and displayed no obvious signs of medical or psychiatric distress. Patient reported mild symptoms of depression/anxiety 2/10. Patient denied hallucinations and did not endorse S/I or H/I. Patient took prescribed medications at 2114. Patient was observed resting/sleeping in bed throughout the night with no concerns. Hourly nursing rounds completed throughout the night. Staff will continue to assess and monitor. Patient slept for 8 hours.        Problem: Suicide  Goal: *STG: Remains safe in hospital  Outcome: Progressing Towards Goal  Goal: *STG: Seeks staff when feelings of self harm or harm towards others arise  Outcome: Progressing Towards Goal  Goal: *STG/LTG: Complies with medication therapy  Outcome: Progressing Towards Goal  Goal: *STG/LTG: No longer expresses self destructive or suicidal thoughts  Outcome: Progressing Towards Goal

## 2022-05-17 PROCEDURE — 65220000003 HC RM SEMIPRIVATE PSYCH

## 2022-05-17 PROCEDURE — 74011250637 HC RX REV CODE- 250/637

## 2022-05-17 PROCEDURE — 74011250637 HC RX REV CODE- 250/637: Performed by: PSYCHIATRY & NEUROLOGY

## 2022-05-17 RX ADMIN — DOXYCYCLINE HYCLATE 100 MG: 100 TABLET, COATED ORAL at 08:51

## 2022-05-17 RX ADMIN — DULOXETINE HYDROCHLORIDE 60 MG: 60 CAPSULE, DELAYED RELEASE ORAL at 16:06

## 2022-05-17 RX ADMIN — BACITRACIN ZINC NEOMYCIN SULFATE POLYMYXIN B SULFATE: 400; 3.5; 5 OINTMENT TOPICAL at 08:52

## 2022-05-17 RX ADMIN — TRAZODONE HYDROCHLORIDE 150 MG: 100 TABLET ORAL at 21:19

## 2022-05-17 RX ADMIN — DULOXETINE HYDROCHLORIDE 60 MG: 60 CAPSULE, DELAYED RELEASE ORAL at 08:51

## 2022-05-17 RX ADMIN — BACITRACIN ZINC NEOMYCIN SULFATE POLYMYXIN B SULFATE: 400; 3.5; 5 OINTMENT TOPICAL at 16:08

## 2022-05-17 RX ADMIN — DOXYCYCLINE HYCLATE 100 MG: 100 TABLET, COATED ORAL at 21:20

## 2022-05-17 RX ADMIN — BUPROPION HYDROCHLORIDE 300 MG: 150 TABLET, EXTENDED RELEASE ORAL at 08:51

## 2022-05-17 NOTE — GROUP NOTE
EVONNE  GROUP DOCUMENTATION INDIVIDUAL                                                                          Group Therapy Note    Date: 5/17/2022    Group Start Time: 1000  Group End Time: 1100  Group Topic: Topic Group    [facility] - [place] [address] AC[place] [facility]    [place], [address] [facility] GROUP DOCUMENTATION GROUP    Group Therapy Note    Attendees: 8         Attendance: Attended    Patient's Goal:  To participate in chair exercise routine    Interventions/techniques: Supported -benefits of exercise    Follows Directions:  Followed directions    Interactions: Interacted appropriately    Mental Status: Calm    Behavior/appearance: Attentive, Cooperative and Needed prompting    Goals Achieved: Able to engage in interactions, Able to listen to others, Able to self-disclose and Discussed coping-completed routine      Harpreet Ziegler

## 2022-05-17 NOTE — GROUP NOTE
EVONNE  GROUP DOCUMENTATION INDIVIDUAL                                                                          Group Therapy Note    Date: 5/17/2022    Group Start Time: 1400  Group End Time: 1500  Group Topic: Recreational/Music Therapy    [facility] - [place] [address] AC[place] BE[place]AV [facility]    [place], [address] [facility] GROUP DOCUMENTATION GROUP    Group Therapy Note    Attendees: 11         Attendance: Did not attend    Patient's Goal:      Interventions/techniquesElva Yo

## 2022-05-17 NOTE — BH NOTES
GROUP THERAPY PROGRESS NOTE    Patient is participating in Coping Skills group. Group time: 50 minutes    Personal goal for participation: To process healthy support systems and establish concrete ways to access support systems when needed    Goal orientation: Personal    Group therapy participation: active    Therapeutic interventions reviewed and discussed: Patients identified what a good support system looks like, feels like, and sounds like. Patients discussed ways in which their social supports have changed, how to ask for help when needed, emotions associated with asking for help, and ways to change their social supports to best aid them following discharge. Patients processed setting appropriate boundaries with social supports and identified coping skills to complete with support system. Impression of participation: Pt presented with euthymic mood, full active, clear speech, calm and cooperative. Pt identified his friend of 17 yo as a strong support. Pt processed need for support system to show active listening, be forgiving and care for his needs.   RAMANDEEP Frank

## 2022-05-17 NOTE — PROGRESS NOTES
Problem: Suicide  Goal: *STG: Remains safe in hospital  Outcome: Progressing Towards Goal  Goal: *STG/LTG: Complies with medication therapy  Outcome: Progressing Towards Goal     Problem: Falls - Risk of  Goal: *Absence of Falls  Description: Document Madi Fall Risk and appropriate interventions in the flowsheet. Outcome: Progressing Towards Goal  Note: Fall Risk Interventions:            Medication Interventions: Teach patient to arise slowly                7079-1259: Assumed care of patient after receiving shift report from outgoing nurse. Patient was out and visible on unit, interacting appropriately with peers and staff. Affect is smiling, mood is euthymic. Pt cooperative with vitals and assessment. A&O x 4. Independent in ADLs. Insight and concentration present. Gait is steady. Appetite patterns WNL. Denies AVH/SI/HI. Patient continues to endorse anxiety and depression but reports he has noticed a significant decrease. Patient states being at the hospital, in a different setting has been a \"reset\" for him. Pt denies pain. Patient medication and diet compliant. Pt encouraged to continue to participate in care. 6536-5880: Patient resting quietly in bed.     6095-9269: Pt has been observed throughout the night. Total hours slept approximately 8.75. No s/s of distress noted. Patient has remained safe. Hourly rounding performed throughout shift.

## 2022-05-17 NOTE — PROGRESS NOTES
Chief Complaint:  \"I am good. \"    Length of Stay: 4 Days    Interval History:  Pt states he is doing better today. He states he slept well. He is eating well. He states depression levels have decreased and anxiety is almost gone today. He is medication compliant and has no SE's to report. He is discharge focused. 5/16 Pepe Cortez reports feeling great compared to admission and moods are good. Anxiety and depression are nearly non-existent. He had several tic bites from living in the woods. Denies SI/HI/AH/VH. Pleasant with no aggression or violence. Appropriately interactive and aware. Focusing better. Tolerating medications well. Eating and sleeping fairly. Discharge focused. 5/17 - Gabriel Naqvi reports feeling well and moods are good. States he tends to exaggerate when he makes statements and never intended to die or kill himself, but rather express how bad he was feeling at the time. Denies SI/HI/AH/VH. No aggression or violence. Appropriately interactive and aware. Tolerating medications well. Eating and sleeping fairly. Past Medical History:  No past medical history on file. Labs:  Lab Results   Component Value Date/Time    WBC 11.2 (H) 05/13/2022 04:24 AM    HGB 14.2 05/13/2022 04:24 AM    HCT 43.1 05/13/2022 04:24 AM    PLATELET 100 96/66/9590 04:24 AM    MCV 86.5 05/13/2022 04:24 AM      Lab Results   Component Value Date/Time    Sodium 137 05/13/2022 04:24 AM    Potassium 3.9 05/13/2022 04:24 AM    Chloride 105 05/13/2022 04:24 AM    CO2 26 05/13/2022 04:24 AM    Anion gap 6 05/13/2022 04:24 AM    Glucose 111 (H) 05/13/2022 04:24 AM    BUN 7 05/13/2022 04:24 AM    Creatinine 0.98 05/13/2022 04:24 AM    BUN/Creatinine ratio 7 (L) 05/13/2022 04:24 AM    GFR est AA >60 05/13/2022 04:24 AM    GFR est non-AA >60 05/13/2022 04:24 AM    Calcium 8.2 (L) 05/13/2022 04:24 AM    Bilirubin, total 0.4 05/13/2022 04:24 AM    Alk.  phosphatase 109 05/13/2022 04:24 AM    Protein, total 7.2 05/13/2022 04:24 AM    Albumin 3.4 (L) 05/13/2022 04:24 AM    Globulin 3.8 05/13/2022 04:24 AM    A-G Ratio 0.9 (L) 05/13/2022 04:24 AM    ALT (SGPT) 22 05/13/2022 04:24 AM      Vitals:    05/15/22 2226 05/16/22 0753 05/16/22 2030 05/17/22 0755   BP: 128/67 118/66 125/68 119/62   Pulse: 82 74 (!) 101 81   Resp: 17 17 15 16   Temp: 98.6 °F (37 °C) 97.3 °F (36.3 °C) 98.2 °F (36.8 °C) 97.5 °F (36.4 °C)   SpO2: 97% 96% 100% 100%   Weight:       Height:             Current Facility-Administered Medications   Medication Dose Route Frequency Provider Last Rate Last Admin    buPROPion XL (WELLBUTRIN XL) tablet 300 mg  300 mg Oral 7am Nicko DORAN NP   300 mg at 05/17/22 0851    traZODone (DESYREL) tablet 150 mg  150 mg Oral QHS [facility] Ale DORAN NP   150 mg at 05/16/22 2107    OLANZapine (ZyPREXA) tablet 5 mg  5 mg Oral Q6H PRN Santhosh Sifuentes MD        haloperidol lactate (HALDOL) injection 5 mg  5 mg IntraMUSCular Q6H PRN Santhosh Sifuentes MD        benztropine (COGENTIN) tablet 1 mg  1 mg Oral BID PRN Santhosh Sifuentes MD        diphenhydrAMINE (BENADRYL) injection 50 mg  50 mg IntraMUSCular BID PRN Santhosh Sifuentes MD        hydrOXYzine HCL (ATARAX) tablet 50 mg  50 mg Oral TID PRN Santhosh Sifuentes MD   50 mg at 05/14/22 0808    LORazepam (ATIVAN) injection 1 mg  1 mg IntraMUSCular Q4H PRN Santhosh Sifuentes MD        acetaminophen (TYLENOL) tablet 650 mg  650 mg Oral Q4H PRN Santhosh Sifuentes MD        magnesium hydroxide (MILK OF MAGNESIA) 400 mg/5 mL oral suspension 30 mL  30 mL Oral DAILY PRN Santhosh Sifuentes MD        doxycycline (VIBRA-TABS) tablet 100 mg  100 mg Oral Q12H Santhosh Sifuentes MD   100 mg at 05/17/22 0851    DULoxetine (CYMBALTA) capsule 60 mg  60 mg Oral BID Santhosh Sifuentes MD   60 mg at 05/17/22 0851    neomycin-bacitracin-polymyxin (NEOSPORIN) ointment   Topical BID Stefanie Leach MD   Given at 05/17/22 2263         Assessment and Plan:  Yousuf Calderón meets criteria for a diagnosis of MDD recurrent with psychotic features, PTSD, JACOBY    Continue the medication regimen as prescribed  Medication modification as appropriate  Disposition planning with social work. A coordinated, multidisplinary treatment team round was conducted with the patient, nurses, pharmcist,  and writer present. Discussions held with , and/or with family members; Complete current electronic health record for patient was reviewed in full including consultant notes, ancillary staff notes, nurses and tech notes, labs and vitals. I certify that this patients inpatient psychiatric hospital services furnished since the previous certification were, and continue to be, required for treatment that could reasonably be expected to improve the patient's condition, or for diagnostic study, and that the patient continues to need, on a daily basis, active treatment furnished directly by or requiring the supervision of inpatient psychiatric facility personnel. In addition, the hospital records show that services furnished were intensive treatment services, admission or related services, or equivalent services.

## 2022-05-17 NOTE — BH NOTES
This writer attempted to contact [address] [place] [address] Rd ([phone]) (patient's mother), no answer and not able to leave voicemail.

## 2022-05-18 PROCEDURE — 65220000003 HC RM SEMIPRIVATE PSYCH

## 2022-05-18 PROCEDURE — 74011250637 HC RX REV CODE- 250/637: Performed by: PSYCHIATRY & NEUROLOGY

## 2022-05-18 PROCEDURE — 74011250637 HC RX REV CODE- 250/637

## 2022-05-18 RX ADMIN — DULOXETINE HYDROCHLORIDE 60 MG: 60 CAPSULE, DELAYED RELEASE ORAL at 08:26

## 2022-05-18 RX ADMIN — DULOXETINE HYDROCHLORIDE 60 MG: 60 CAPSULE, DELAYED RELEASE ORAL at 17:40

## 2022-05-18 RX ADMIN — BUPROPION HYDROCHLORIDE 300 MG: 150 TABLET, EXTENDED RELEASE ORAL at 08:26

## 2022-05-18 RX ADMIN — DOXYCYCLINE HYCLATE 100 MG: 100 TABLET, COATED ORAL at 21:21

## 2022-05-18 RX ADMIN — DOXYCYCLINE HYCLATE 100 MG: 100 TABLET, COATED ORAL at 08:26

## 2022-05-18 RX ADMIN — BACITRACIN ZINC NEOMYCIN SULFATE POLYMYXIN B SULFATE: 400; 3.5; 5 OINTMENT TOPICAL at 11:12

## 2022-05-18 RX ADMIN — TRAZODONE HYDROCHLORIDE 150 MG: 100 TABLET ORAL at 21:21

## 2022-05-18 NOTE — PROGRESS NOTES
2200: Rashid Vaz is awake, visible in the milieu, and interactive upon approach. Hygiene is adequate, independent in ADLs. Gait is steady. Sleep and appetite patterns WNL per patient. Denies SI/HI and demonstrates no evidence of intent to harm self or others. Denies hallucinations at present. Compliant with scheduled meds. No PRN meds given at this time. Pt encouraged to continue to participate in care. Will continue to monitor pt with q 15 min checks. 0600: Pt has been observed throughout the night sleeping in bed with even respirations. Total hours slept approximately  9. Hourly nursing rounds maintained. No s/s of distress noted. Will continue to monitor.                  Problem: Suicide  Goal: *STG: Remains safe in hospital  Outcome: Progressing Towards Goal

## 2022-05-18 NOTE — GROUP NOTE
EVONNE  GROUP DOCUMENTATION INDIVIDUAL                                                                          Group Therapy Note    Date: 5/18/2022    Group Start Time: 1500  Group End Time: 1600  Group Topic: Recreational/Music Therapy    [facility] - [place] [address] AC[place]    [place], [address] [facility] GROUP DOCUMENTATION GROUP    Group Therapy Note    Attendees: 10         Attendance: Did not attend    Patient's Goal:      Interventions/techniques:  Quang Li

## 2022-05-18 NOTE — PROGRESS NOTES
Chief Complaint:  \"I am good. \"    Length of Stay: 5 Days    Interval History:  Pt states he is doing better today. He states he slept well. He is eating well. He states depression levels have decreased and anxiety is almost gone today. He is medication compliant and has no SE's to report. He is discharge focused. 5/16 Kaden Sarabia reports feeling great compared to admission and moods are good. Anxiety and depression are nearly non-existent. He had several tic bites [place]. Denies SI/HI/AH/VH. Pleasant with no aggression or violence. Appropriately interactive and aware. Focusing better. Tolerating medications well. Eating and sleeping fairly. Discharge focused. 5/17 Bina Hernadez reports feeling well and moods are good. States he tends to exaggerate when he makes statements and never intended to die or kill himself, but rather express how bad he was feeling at the time. Denies SI/HI/AH/VH. No aggression or violence. Appropriately interactive and aware. Tolerating medications well. Eating and sleeping fairly. 5/18 - Denise Hernadez reports feeling well and moods are good. Discharge focused. Denies SI/HI/AH/VH. No aggression or violence. Appropriately interactive and aware. Tolerating medications well. Eating and sleeping fairly. Past Medical History:  No past medical history on file.         Labs:  Lab Results   Component Value Date/Time    WBC 11.2 (H) 05/13/2022 04:24 AM    HGB 14.2 05/13/2022 04:24 AM    HCT 43.1 05/13/2022 04:24 AM    PLATELET 122 36/43/0314 04:24 AM    MCV 86.5 05/13/2022 04:24 AM      Lab Results   Component Value Date/Time    Sodium 137 05/13/2022 04:24 AM    Potassium 3.9 05/13/2022 04:24 AM    Chloride 105 05/13/2022 04:24 AM    CO2 26 05/13/2022 04:24 AM    Anion gap 6 05/13/2022 04:24 AM    Glucose 111 (H) 05/13/2022 04:24 AM    BUN 7 05/13/2022 04:24 AM    Creatinine 0.98 05/13/2022 04:24 AM    BUN/Creatinine ratio 7 (L) 05/13/2022 04:24 AM    GFR est AA >60 05/13/2022 04:24 AM    GFR est non-AA >60 05/13/2022 04:24 AM    Calcium 8.2 (L) 05/13/2022 04:24 AM    Bilirubin, total 0.4 05/13/2022 04:24 AM    Alk.  phosphatase 109 05/13/2022 04:24 AM    Protein, total 7.2 05/13/2022 04:24 AM    Albumin 3.4 (L) 05/13/2022 04:24 AM    Globulin 3.8 05/13/2022 04:24 AM    A-G Ratio 0.9 (L) 05/13/2022 04:24 AM    ALT (SGPT) 22 05/13/2022 04:24 AM      Vitals:    05/16/22 2030 05/17/22 0755 05/17/22 2100 05/18/22 0746   BP: 125/68 119/62 106/65 122/70   Pulse: (!) 101 81 81 73   Resp: 15 16 16 18   Temp: 98.2 °F (36.8 °C) 97.5 °F (36.4 °C) 98.4 °F (36.9 °C) 97 °F (36.1 °C)   SpO2: 100% 100% 96% 99%   Weight:       Height:             Current Facility-Administered Medications   Medication Dose Route Frequency Provider Last Rate Last Admin    buPROPion XL (WELLBUTRIN XL) tablet 300 mg  300 mg Oral 7am Franceen Sender A, NP   300 mg at 05/18/22 6862    traZODone (DESYREL) tablet 150 mg  150 mg Oral QHS Francereilly Sender A, NP   150 mg at 05/17/22 2119    OLANZapine (ZyPREXA) tablet 5 mg  5 mg Oral Q6H PRN Santhosh Sifuentes MD        haloperidol lactate (HALDOL) injection 5 mg  5 mg IntraMUSCular Q6H PRN Santhosh Sifuentes MD        benztropine (COGENTIN) tablet 1 mg  1 mg Oral BID PRN Santhosh Sifuentes MD        diphenhydrAMINE (BENADRYL) injection 50 mg  50 mg IntraMUSCular BID PRN Santhosh Sifuentes MD        hydrOXYzine HCL (ATARAX) tablet 50 mg  50 mg Oral TID PRN Santhosh Sifuentes MD   50 mg at 05/14/22 0808    LORazepam (ATIVAN) injection 1 mg  1 mg IntraMUSCular Q4H PRN Santhosh Sifuentes MD        acetaminophen (TYLENOL) tablet 650 mg  650 mg Oral Q4H PRN Santhosh Sifuentes MD        magnesium hydroxide (MILK OF MAGNESIA) 400 mg/5 mL oral suspension 30 mL  30 mL Oral DAILY PRN Santhosh Sifuentes MD        doxycycline (VIBRA-TABS) tablet 100 mg  100 mg Oral Q12H Wallace Neff MD   100 mg at 05/18/22 0826    DULoxetine (CYMBALTA) capsule 60 mg  60 mg Oral BID Santhosh Sifuentes MD   60 mg at 05/18/22 9823    neomycin-bacitracin-polymyxin (NEOSPORIN) ointment   Topical BID Tyra Plascencia MD   Given at 05/18/22 1112         Assessment and Plan:  Roscoe Cervantes meets criteria for a diagnosis of MDD recurrent with psychotic features, PTSD, JACOBY    Continue the medication regimen as prescribed  Medication modification as appropriate  Disposition planning with social work. A coordinated, multidisplinary treatment team round was conducted with the patient, nurses, pharmcist,  and writer present. Discussions held with , and/or with family members; Complete current electronic health record for patient was reviewed in full including consultant notes, ancillary staff notes, nurses and tech notes, labs and vitals. I certify that this patients inpatient psychiatric hospital services furnished since the previous certification were, and continue to be, required for treatment that could reasonably be expected to improve the patient's condition, or for diagnostic study, and that the patient continues to need, on a daily basis, active treatment furnished directly by or requiring the supervision of inpatient psychiatric facility personnel. In addition, the hospital records show that services furnished were intensive treatment services, admission or related services, or equivalent services.

## 2022-05-18 NOTE — BH NOTES
Faxed clinicals to [facility] for possible admission. Patient was accepted at the [facility] for admission tomorrow. Patient will discharge tomorrow at 11:00 am. Patient denies SI/HI/AH/VH. Patient reports that he wants to go back to his tent in order to go to [facility]. This writer informed him that the hospital is not comfortable discharging him back to his tent in [place]. This writer provided education about having shelter and food to maintain mental health and physical health stability in the community. Patient was open to placement. No other concerns at this time.       RAMANDEEP Whitney

## 2022-05-18 NOTE — GROUP NOTE
EVONNE  GROUP DOCUMENTATION INDIVIDUAL                                                                          Group Therapy Note    Date: 5/18/2022    Group Start Time: 0900  Group End Time: 1000  Group Topic: Topic Group    [facility] 3 ACUTE BEHAV [facility], [address] GROUP    Group Therapy Note    Attendees: 9         Attendance: Did not attend       Halo

## 2022-05-18 NOTE — BH NOTES
The documentation for this period is being entered following the guidelines as defined in the [facility] policy by Gopi Thacker RN.

## 2022-05-19 VITALS
BODY MASS INDEX: 31.03 KG/M2 | RESPIRATION RATE: 17 BRPM | WEIGHT: 209.5 LBS | HEART RATE: 73 BPM | DIASTOLIC BLOOD PRESSURE: 60 MMHG | SYSTOLIC BLOOD PRESSURE: 112 MMHG | TEMPERATURE: 97.6 F | HEIGHT: 69 IN | OXYGEN SATURATION: 100 %

## 2022-05-19 PROCEDURE — 74011250637 HC RX REV CODE- 250/637

## 2022-05-19 PROCEDURE — 74011250637 HC RX REV CODE- 250/637: Performed by: PSYCHIATRY & NEUROLOGY

## 2022-05-19 RX ORDER — BUPROPION HYDROCHLORIDE 300 MG/1
300 TABLET ORAL
Qty: 30 TABLET | Refills: 0 | Status: SHIPPED | OUTPATIENT
Start: 2022-05-20 | End: 2022-05-20

## 2022-05-19 RX ORDER — TRAZODONE HYDROCHLORIDE 150 MG/1
150 TABLET ORAL
Qty: 30 TABLET | Refills: 0 | Status: SHIPPED | OUTPATIENT
Start: 2022-05-19 | End: 2022-05-20

## 2022-05-19 RX ORDER — DULOXETIN HYDROCHLORIDE 60 MG/1
60 CAPSULE, DELAYED RELEASE ORAL 2 TIMES DAILY
Qty: 60 CAPSULE | Refills: 0 | Status: SHIPPED | OUTPATIENT
Start: 2022-05-19 | End: 2022-05-20

## 2022-05-19 RX ORDER — DOXYCYCLINE HYCLATE 100 MG
100 TABLET ORAL EVERY 12 HOURS
Qty: 1 TABLET | Refills: 0 | Status: SHIPPED | OUTPATIENT
Start: 2022-05-19 | End: 2022-05-20

## 2022-05-19 RX ADMIN — DULOXETINE HYDROCHLORIDE 60 MG: 60 CAPSULE, DELAYED RELEASE ORAL at 07:51

## 2022-05-19 RX ADMIN — DOXYCYCLINE HYCLATE 100 MG: 100 TABLET, COATED ORAL at 08:53

## 2022-05-19 RX ADMIN — BACITRACIN ZINC NEOMYCIN SULFATE POLYMYXIN B SULFATE: 400; 3.5; 5 OINTMENT TOPICAL at 07:51

## 2022-05-19 RX ADMIN — BUPROPION HYDROCHLORIDE 300 MG: 150 TABLET, EXTENDED RELEASE ORAL at 07:51

## 2022-05-19 NOTE — BH NOTES
Behavioral Health Transition Record to Provider    Patient Name: Celine Dean  YOB: 1980  Medical Record Number: [id number]  Date of Admission: 5/13/2022  Date of Discharge: 5/19/2022    Attending Provider: No att. providers found  Discharging Provider: Michele Iniguez MD  To contact this individual call [phone] and ask the  to page. If unavailable, ask to be transferred to [facility] Provider on call. [facility] Provider will be available on call 24/7 and during holidays. Primary Care Provider: None    No Known Allergies    Reason for Admission: ECO turned into voluntary admission due to SI with plan and intent to lay on train tracks.   [facility] turned into voluntary admission due to SI with plan and intent to lay on train tracks.      Admission Diagnosis: Adjustment disorder with depressed mood [F43.21]    * No surgery found *    Results for orders placed or performed during the hospital encounter of 05/13/22   COVID-19 RAPID TEST   Result Value Ref Range    Specimen source Nasopharyngeal      COVID-19 rapid test Not detected NOTD     COVID-19 WITH INFLUENZA A/B   Result Value Ref Range    SARS-CoV-2 by PCR Not detected NOTD      Influenza A by PCR Not detected NOTD      Influenza B by PCR Not detected NOTD     CBC WITH AUTOMATED DIFF   Result Value Ref Range    WBC 11.2 (H) 4.1 - 11.1 K/uL    RBC 4.98 4.10 - 5.70 M/uL    HGB 14.2 12.1 - 17.0 g/dL    HCT 43.1 36.6 - 50.3 %    MCV 86.5 80.0 - 99.0 FL    MCH 28.5 26.0 - 34.0 PG    MCHC 32.9 30.0 - 36.5 g/dL    RDW 13.4 11.5 - 14.5 %    PLATELET 499 115 - 639 K/uL    MPV 8.8 (L) 8.9 - 12.9 FL    NRBC 0.0 0  WBC    ABSOLUTE NRBC 0.00 0.00 - 0.01 K/uL    NEUTROPHILS 67 32 - 75 %    LYMPHOCYTES 23 12 - 49 %    MONOCYTES 8 5 - 13 %    EOSINOPHILS 1 0 - 7 %    BASOPHILS 1 0 - 1 %    IMMATURE GRANULOCYTES 0 0.0 - 0.5 %    ABS. NEUTROPHILS 7.4 1.8 - 8.0 K/UL    ABS. LYMPHOCYTES 2.6 0.8 - 3.5 K/UL    ABS.  MONOCYTES 0.9 0.0 - 1.0 K/UL    ABS. EOSINOPHILS 0.1 0.0 - 0.4 K/UL    ABS. BASOPHILS 0.1 0.0 - 0.1 K/UL    ABS. IMM. GRANS. 0.1 (H) 0.00 - 0.04 K/UL    DF AUTOMATED     METABOLIC PANEL, COMPREHENSIVE   Result Value Ref Range    Sodium 137 136 - 145 mmol/L    Potassium 3.9 3.5 - 5.1 mmol/L    Chloride 105 97 - 108 mmol/L    CO2 26 21 - 32 mmol/L    Anion gap 6 5 - 15 mmol/L    Glucose 111 (H) 65 - 100 mg/dL    BUN 7 6 - 20 MG/DL    Creatinine 0.98 0.70 - 1.30 MG/DL    BUN/Creatinine ratio 7 (L) 12 - 20      GFR est AA >60 >60 ml/min/1.73m2    GFR est non-AA >60 >60 ml/min/1.73m2    Calcium 8.2 (L) 8.5 - 10.1 MG/DL    Bilirubin, total 0.4 0.2 - 1.0 MG/DL    ALT (SGPT) 22 12 - 78 U/L    AST (SGOT) 9 (L) 15 - 37 U/L    Alk.  phosphatase 109 45 - 117 U/L    Protein, total 7.2 6.4 - 8.2 g/dL    Albumin 3.4 (L) 3.5 - 5.0 g/dL    Globulin 3.8 2.0 - 4.0 g/dL    A-G Ratio 0.9 (L) 1.1 - 2.2     ACETAMINOPHEN   Result Value Ref Range    Acetaminophen level <2 (L) 10 - 30 ug/mL   ETHYL ALCOHOL   Result Value Ref Range    ALCOHOL(ETHYL),SERUM <10 <10 MG/DL   URINALYSIS W/ REFLEX CULTURE    Specimen: Urine   Result Value Ref Range    Color YELLOW/STRAW      Appearance CLEAR CLEAR      Specific gravity 1.012      pH (UA) 5.0 5.0 - 8.0      Protein Negative NEG mg/dL    Glucose Negative NEG mg/dL    Ketone Negative NEG mg/dL    Bilirubin Negative NEG      Blood Negative NEG      Urobilinogen 0.2 0.2 - 1.0 EU/dL    Nitrites Negative NEG      Leukocyte Esterase Negative NEG      UA:UC IF INDICATED CULTURE NOT INDICATED BY UA RESULT      WBC 0-4 0 - 4 /hpf    RBC 0-5 0 - 5 /hpf    Epithelial cells FEW FEW /lpf    Bacteria Negative NEG /hpf    Hyaline cast 0-2 0 - 2 /lpf   DRUG SCREEN, URINE   Result Value Ref Range    AMPHETAMINES Negative NEG      BARBITURATES Negative NEG      BENZODIAZEPINES Negative NEG      COCAINE Negative NEG      METHADONE Negative NEG      OPIATES Negative NEG      PCP(PHENCYCLIDINE) Negative NEG      THC (TH-CANNABINOL) Negative NEG      Drug screen comment (NOTE)    SALICYLATE   Result Value Ref Range    Salicylate level <7.0 (L) 2.8 - 20.0 MG/DL   SARS-COV-2   Result Value Ref Range    SARS-CoV-2 by PCR Please find results under separate order     EKG, 12 LEAD, INITIAL   Result Value Ref Range    Ventricular Rate 90 BPM    Atrial Rate 90 BPM    P-R Interval 152 ms    QRS Duration 78 ms    Q-T Interval 340 ms    QTC Calculation (Bezet) 415 ms    Calculated P Axis 61 degrees    Calculated R Axis 32 degrees    Calculated T Axis 28 degrees    Diagnosis       Normal sinus rhythm  Possible Left atrial enlargement  No previous ECGs available  Confirmed by Karie Luu ([id number]) on 5/13/2022 10:25:26 AM         Immunizations administered during this encounter: There is no immunization history on file for this patient. Screening for Metabolic Disorders for Patients on Antipsychotic Medications  (Data obtained from the EMR)    Estimated Body Mass Index  Estimated body mass index is 30.94 kg/m² as calculated from the following:    Height as of this encounter: 5' 9\" (1.753 m). Weight as of this encounter: 95 kg (209 lb 8 oz). Vital Signs/Blood Pressure  Visit Vitals  /60   Pulse 73   Temp 97.6 °F (36.4 °C)   Resp 17   Ht 5' 9\" (1.753 m)   Wt 95 kg (209 lb 8 oz)   SpO2 100%   BMI 30.94 kg/m²       Blood Glucose/Hemoglobin A1c  Lab Results   Component Value Date/Time    Glucose 111 (H) 05/13/2022 04:24 AM       No results found for: HBA1C, [id number]     Lipid Panel  No results found for: CHOL, CHOLX, CHLST, CHOLV, 360504, HDL, HDLP, LDL, LDLC, DLDLP, TGLX, TRIGL, TRIGP, CHHD, CHHDX     Discharge Diagnosis: Please refer to physician's summary    Discharge Plan: The patient Celine Dean exhibits the ability to control behavior in a less restrictive environment. Patient's level of functioning is improving. No assaultive/destructive behavior has been observed for the past 24 hours.   No suicidal/homicidal threat or behavior has been observed for the past 24 hours. There is no evidence of serious medication side effects. Patient has not been in physical or protective restraints for at least the past 24 hours.     If weapons involved, how are they secured? No access      Is patient aware of and in agreement with discharge plan? Yes     Arrangements for medication:  Prescriptions given to patient, given a  30 day supply.      Copy of discharge instructions to provider?:  Faxed to [address] [place] [address] Avenue for transportation home:  Lisbeth Dao Sera all follow up appointments as scheduled, continue to take prescribed medications per physician instructions. Mental health crisis number:  979 or your local mental health crisis line number at [address] Emergency WARM LINE      4-267-883-MHAV ([phone])      M-F: 9am to 9pm      Sat & Sun: 5pm  9pm  National suicide prevention RIYGQ:                             0-445-VZXAHNY ([phone])       5-695-364-TALK ([phone])   24/7 Crisis Text Line:  Text HOME to 162629    Discharge Medication List and Instructions:   Discharge Medication List as of 5/19/2022 10:43 AM      START taking these medications    Details   buPROPion XL (WELLBUTRIN XL) 300 mg XL tablet Take 1 Tablet by mouth every morning. Indications: major depressive disorder, Normal, Disp-30 Tablet, R-0      neomycin-bacitracin-polymyxin (NEOSPORIN) 3.5mg-400 unit- 5,000 unit/gram ointment Apply  to affected area two (2) times a day. Indications: minor skin infection due to bacteria, Normal, Disp-9 g, R-0         CONTINUE these medications which have CHANGED    Details   doxycycline (VIBRA-TABS) 100 mg tablet Take 1 Tablet by mouth every twelve (12) hours for 1 dose. Indications: treatment to prevent lyme disease, Skin and Soft Tissue Infection, Normal, Disp-1 Tablet, R-0      DULoxetine (CYMBALTA) 60 mg capsule Take 1 Capsule by mouth two (2) times a day.  Indications: anxiousness associated with depression, Normal, Disp-60 Capsule, R-0      traZODone (DESYREL) 150 mg tablet Take 1 Tablet by mouth nightly. Indications: insomnia associated with depression, Normal, Disp-30 Tablet, R-0         STOP taking these medications       buPROPion (WELLBUTRIN) 100 mg tablet Comments:   Reason for Stopping:               Unresulted Labs (24h ago, onward)            None        To obtain results of studies pending at discharge, please contact medical records [phone] option 4    Follow-up Information     Follow up With Specialties Details Why [address] [facility] will be transporting you the designated hotel. Your treatment team will be there to meet with you upon arrival. [address] [employer]    [address] [place] [address], [facility]  Phone: [phone]  Fax: [phone]  Crisis Services: [phone]      Crisis Resources     National Suicide Prevention Lifeline:  24/7 hotline, 1 026 811 69 92  Warm Line:  Monday-Friday 9 AM-9 PM, Saturday-Sunday 5 PM- 9 PM  1 (211) 527-7658  Crisis Text Line:  24/7 crisis text messaging. Text HOME to 553073. Substance Abuse 24Hour Hotl. .. None    None (395) Patient stated that they have no PCP            Advanced Directive:   Does the patient have an appointed surrogate decision maker? No  Does the patient have a Medical Advance Directive? No  Does the patient have a Psychiatric Advance Directive? No  If the patient does not have a surrogate or Medical Advance Directive AND Psychiatric Advance Directive, the patient was offered information on these advance directives Patient declined to complete    Patient Instructions: Please continue all medications until otherwise directed by physician. Tobacco Cessation Discharge Plan:   Is the patient a smoker and needs referral for smoking cessation? Refused  Patient referred to the following for smoking cessation with an appointment?  Refused     Patient was offered medication to assist with smoking cessation at discharge? Refused  Was education for smoking cessation added to the discharge instructions? Refused    Alcohol/Substance Abuse Discharge Plan:   Does the patient have a history of substance/alcohol abuse and requires a referral for treatment? No  Patient referred to the following for substance/alcohol abuse treatment with an appointment? No  Patient was offered medication to assist with alcohol cessation at discharge? No  Was education for substance/alcohol abuse added to discharge instructions? No    Patient discharged to Home; provided to the patient/caregiver either in hard copy or electronically.

## 2022-05-19 NOTE — BH NOTES
Pt is alert and oriented x person, place and time. Pt denies any SI/HI or AV hallucinations. Pt denies any depression. Discharge information reviewed with patient. Pt verbalizes understanding. Pt's belongings/valuables returned. Pt to be transported lyft.

## 2022-05-19 NOTE — BH NOTES
DISCHARGE SUMMARY    NAME:Erick Millard  : 1980  MRN: [id number]    The patient Heber Diaz exhibits the ability to control behavior in a less restrictive environment. Patient's level of functioning is improving. No assaultive/destructive behavior has been observed for the past 24 hours. No suicidal/homicidal threat or behavior has been observed for the past 24 hours. There is no evidence of serious medication side effects. Patient has not been in physical or protective restraints for at least the past 24 hours. If weapons involved, how are they secured? No access     Is patient aware of and in agreement with discharge plan? Yes    Arrangements for medication:  Prescriptions given to patient, given a  30 day supply. Copy of discharge instructions to provider?:  Faxed to [address] [place] [address] for transportation home:  Nancy    Keep all follow up appointments as scheduled, continue to take prescribed medications per physician instructions.   Mental health crisis number:  415 or your local mental health crisis line number at [address] [place] [address] Emergency WARM LINE      [phone]-MHAV [phone])      M-F: 9am to 9pm      Sat & Sun: 5pm  9pm  National suicide prevention lines:                             4-182-ULPDLFB ([phone])       0-499-799-TALK ([phone])    Crisis Text Line:  Text HOME to 316557
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
## 2022-05-19 NOTE — PROGRESS NOTES
Problem: Suicide  Goal: *STG: Remains safe in hospital  Outcome: Progressing Towards Goal  Goal: *STG/LTG: Complies with medication therapy  Outcome: Progressing Towards Goal  Goal: *STG/LTG: No longer expresses self destructive or suicidal thoughts  Outcome: Progressing Towards Goal     Problem: Suicide  Goal: *STG: Remains safe in hospital  Outcome: Progressing Towards Goal  Goal: *STG/LTG: Complies with medication therapy  Outcome: Progressing Towards Goal  Goal: *STG/LTG: No longer expresses self destructive or suicidal thoughts  Outcome: Progressing Towards Goal

## 2022-05-19 NOTE — PROGRESS NOTES
Patient was social with staff and peers. He denied SI, HI, AVH and pain. He was med compliant. He appears to be sleeping well.

## 2022-05-19 NOTE — PROGRESS NOTES
Starling Arm was noted in the milieu interacting with his peers. He denied SI/HI/AVH, depression, anxiety, and pain. He presented alert and oriented x4, broad affect, mood euthymic. He was med compliant. Monitoring for safety and behaviors continues.

## 2022-05-19 NOTE — PROGRESS NOTES
Problem: Suicide  Goal: *STG: Remains safe in hospital  Outcome: Resolved/Met  Goal: *STG: Seeks staff when feelings of self harm or harm towards others arise  Outcome: Resolved/Met  Goal: *STG: Attends activities and groups  Outcome: Resolved/Met  Goal: *STG:  Verbalizes alternative ways of dealing with maladaptive feelings/behaviors  Outcome: Resolved/Met  Goal: *STG/LTG: Complies with medication therapy  Outcome: Resolved/Met  Goal: *STG/LTG: No longer expresses self destructive or suicidal thoughts  Outcome: Resolved/Met  Goal: *LTG:  Identifies available community resources  Outcome: Resolved/Met  Goal: *LTG:  Develops proactive suicide prevention plan  Outcome: Resolved/Met  Goal: Interventions  Outcome: Resolved/Met     Problem: Patient Education: Go to Patient Education Activity  Goal: Patient/Family Education  Outcome: Resolved/Met     Problem: Falls - Risk of  Goal: *Absence of Falls  Description: Document Madi Fall Risk and appropriate interventions in the flowsheet.   Outcome: Resolved/Met     Problem: Patient Education: Go to Patient Education Activity  Goal: Patient/Family Education  Outcome: Resolved/Met     Problem: Discharge Planning  Goal: *Discharge to safe environment  Outcome: Resolved/Met  Goal: *Knowledge of medication management  Outcome: Resolved/Met

## 2022-05-19 NOTE — DISCHARGE INSTRUCTIONS
Patient Education        Adjustment Disorder: Care Instructions  Overview     Adjustment disorder is a mental health condition that results from stress and can cause severe emotional and behavioral responses. But your response to the stress is far more severe than expected. It is severe enough to affect your work or social life and may lead to depression and physical pains and problems. Events that may cause this response can include a divorce, money problems, or starting school or a new job. It might be anything that causes some stress. This disorder is most often a short-term condition. It happens within 3 months of the stressful event or change. If the response lasts longer than 6 months after the event ends, you may have a different mental health condition. Follow-up care is a key part of your treatment and safety. Be sure to make and go to all appointments, and call your doctor if you are having problems. It's also a good idea to know your test results and keep a list of the medicines you take. How can you care for yourself at home? · Go to all counseling sessions. Do not skip any because you are feeling better. · If your doctor prescribed medicines, take them exactly as prescribed. Call your doctor if you think you are having a problem with your medicine. You will get more details on the specific medicines your doctor prescribes. · Discuss the causes of your stress with a good friend or family member. Or you can join a support group for people with similar problems. Talking to others sometimes relieves stress. · Get at least 30 minutes of exercise on most days of the week. Walking is a good choice. You also may want to do other activities, such as running, swimming, cycling, or playing tennis or team sports. Relaxation techniques  Do relaxation exercises 10 to 20 minutes a day. You can play soothing, relaxing music while you do them, if you wish.   · Tell others in your house that you are going to do your relaxation exercises. Ask them not to disturb you. · Find a comfortable, quiet place. · Lie down on your back, or sit with your back straight. · Focus on your breathing. Make it slow and steady. · Breathe in through your nose. Breathe out through either your nose or mouth. · Breathe deeply, filling up the area between your navel and your rib cage. Breathe so that your belly goes up and down. · Do not hold your breath. · Breathe like this for 5 to 10 minutes. Notice the feeling of calmness throughout your whole body. As you continue to breathe slowly and deeply, relax by doing these next steps for another 5 to 10 minutes:  · Tighten and relax each muscle group in your body. Start at your toes, and work your way up to your head. · Imagine your muscle groups relaxing and getting heavy. · Empty your mind of all thoughts. · Let yourself relax more and more deeply. · Be aware of the state of calmness that surrounds you. · When your relaxation time is over, you can bring yourself back to alertness by moving your fingers and toes. Then move your hands and feet. And then move your entire body. Sometimes people fall asleep during relaxation. But they most often wake up soon. · Always give yourself time to return to full alertness before you drive a car. Wait to do anything that might cause an accident if you are not fully alert. Never play a relaxation tape while you drive a car. When should you call for help? Call 911 anytime you think you may need emergency care. For example, call if:    · You feel you cannot stop from hurting yourself or someone else. If you or someone you know talks about suicide, self-harm, or feeling hopeless, get help right away. Call the [address] [place] [address] at 4-427-134-SSCB ([phone]) or text HOME to 912059 to access the Crisis Text Line. Consider saving these numbers in your phone.   Watch closely for changes in your health, and be sure to contact your doctor if:    · You have new anxiety, or your anxiety gets worse.     · You have been feeling sad, depressed, or hopeless or have lost interest in things that you usually enjoy.     · You do not get better as expected. Where can you learn more? Go to http://www.gray.com/  Enter R087 in the search box to learn more about \"Adjustment Disorder: Care Instructions. \"  Current as of: June 16, 2021               Content Version: 13.2  © 2006-2022 [employer]wise, [employer]. Care instructions adapted under license by [employer] (which disclaims liability or warranty for this information). If you have questions about a medical condition or this instruction, always ask your healthcare professional. [address] any warranty or liability for your use of this information.

## 2022-05-20 RX ORDER — DOXYCYCLINE HYCLATE 100 MG
100 TABLET ORAL EVERY 12 HOURS
Qty: 1 TABLET | Refills: 0 | Status: SHIPPED | OUTPATIENT
Start: 2022-05-20 | End: 2022-05-21

## 2022-05-20 RX ORDER — BUPROPION HYDROCHLORIDE 300 MG/1
300 TABLET ORAL
Qty: 30 TABLET | Refills: 0 | Status: SHIPPED | OUTPATIENT
Start: 2022-05-20

## 2022-05-20 RX ORDER — DULOXETIN HYDROCHLORIDE 60 MG/1
60 CAPSULE, DELAYED RELEASE ORAL 2 TIMES DAILY
Qty: 60 CAPSULE | Refills: 0 | Status: SHIPPED | OUTPATIENT
Start: 2022-05-20

## 2022-05-20 RX ORDER — TRAZODONE HYDROCHLORIDE 150 MG/1
150 TABLET ORAL
Qty: 30 TABLET | Refills: 0 | Status: SHIPPED | OUTPATIENT
Start: 2022-05-20

## 2023-07-29 NOTE — PROGRESS NOTES
Waldemar Hawking was noted in the milieu interacting with his peers. He denied SI/HI/AVH, depression, anxiety, and pain. He presented alert and oriented x4, broad affect, mood euthymic, pleasant. He was med compliant. Monitoring for safety and behaviors continues. Skin normal color for race, warm, dry and intact. No evidence of rash.